# Patient Record
Sex: MALE | Race: WHITE | NOT HISPANIC OR LATINO | URBAN - METROPOLITAN AREA
[De-identification: names, ages, dates, MRNs, and addresses within clinical notes are randomized per-mention and may not be internally consistent; named-entity substitution may affect disease eponyms.]

---

## 2023-04-07 ENCOUNTER — APPOINTMENT (EMERGENCY)
Dept: CT IMAGING | Facility: HOSPITAL | Age: 43
End: 2023-04-07

## 2023-04-07 ENCOUNTER — HOSPITAL ENCOUNTER (EMERGENCY)
Facility: HOSPITAL | Age: 43
Discharge: HOME | End: 2023-04-08
Attending: EMERGENCY MEDICINE

## 2023-04-07 VITALS
HEART RATE: 81 BPM | BODY MASS INDEX: 45.1 KG/M2 | RESPIRATION RATE: 18 BRPM | DIASTOLIC BLOOD PRESSURE: 72 MMHG | SYSTOLIC BLOOD PRESSURE: 119 MMHG | TEMPERATURE: 97.5 F | OXYGEN SATURATION: 97 % | HEIGHT: 70 IN | WEIGHT: 315 LBS

## 2023-04-07 DIAGNOSIS — J44.1 ASTHMA WITH COPD WITH EXACERBATION (HCC): ICD-10-CM

## 2023-04-07 DIAGNOSIS — R07.9 CHEST PAIN WITH LOW RISK OF ACUTE CORONARY SYNDROME: ICD-10-CM

## 2023-04-07 DIAGNOSIS — K76.0 FATTY LIVER: Primary | ICD-10-CM

## 2023-04-07 DIAGNOSIS — M79.89 SOFT TISSUE MASS: ICD-10-CM

## 2023-04-07 DIAGNOSIS — J45.901 ASTHMA WITH COPD WITH EXACERBATION (HCC): ICD-10-CM

## 2023-04-07 DIAGNOSIS — N20.0 NEPHROLITHIASIS: ICD-10-CM

## 2023-04-07 LAB
ALBUMIN SERPL BCP-MCNC: 4.1 G/DL (ref 3.5–5)
ALP SERPL-CCNC: 127 U/L (ref 34–104)
ALT SERPL W P-5'-P-CCNC: 46 U/L (ref 7–52)
ANION GAP SERPL CALCULATED.3IONS-SCNC: 8 MMOL/L (ref 4–13)
AST SERPL W P-5'-P-CCNC: 24 U/L (ref 13–39)
ATRIAL RATE: 90 BPM
BASOPHILS # BLD AUTO: 0.07 THOUSANDS/ÂΜL (ref 0–0.1)
BASOPHILS NFR BLD AUTO: 1 % (ref 0–1)
BILIRUB SERPL-MCNC: 0.26 MG/DL (ref 0.2–1)
BUN SERPL-MCNC: 12 MG/DL (ref 5–25)
CALCIUM SERPL-MCNC: 9.5 MG/DL (ref 8.4–10.2)
CARDIAC TROPONIN I PNL SERPL HS: 4 NG/L
CHLORIDE SERPL-SCNC: 105 MMOL/L (ref 96–108)
CO2 SERPL-SCNC: 27 MMOL/L (ref 21–32)
CREAT SERPL-MCNC: 0.86 MG/DL (ref 0.6–1.3)
D DIMER PPP FEU-MCNC: 0.62 UG/ML FEU
EOSINOPHIL # BLD AUTO: 0.37 THOUSAND/ÂΜL (ref 0–0.61)
EOSINOPHIL NFR BLD AUTO: 3 % (ref 0–6)
ERYTHROCYTE [DISTWIDTH] IN BLOOD BY AUTOMATED COUNT: 13.1 % (ref 11.6–15.1)
GFR SERPL CREATININE-BSD FRML MDRD: 107 ML/MIN/1.73SQ M
GLUCOSE SERPL-MCNC: 97 MG/DL (ref 65–140)
HCT VFR BLD AUTO: 42 % (ref 36.5–49.3)
HGB BLD-MCNC: 14 G/DL (ref 12–17)
IMM GRANULOCYTES # BLD AUTO: 0.09 THOUSAND/UL (ref 0–0.2)
IMM GRANULOCYTES NFR BLD AUTO: 1 % (ref 0–2)
LITHIUM SERPL-SCNC: 0.3 MMOL/L (ref 0.6–1.2)
LYMPHOCYTES # BLD AUTO: 2.75 THOUSANDS/ÂΜL (ref 0.6–4.47)
LYMPHOCYTES NFR BLD AUTO: 20 % (ref 14–44)
MCH RBC QN AUTO: 29.6 PG (ref 26.8–34.3)
MCHC RBC AUTO-ENTMCNC: 33.3 G/DL (ref 31.4–37.4)
MCV RBC AUTO: 89 FL (ref 82–98)
MONOCYTES # BLD AUTO: 0.77 THOUSAND/ÂΜL (ref 0.17–1.22)
MONOCYTES NFR BLD AUTO: 6 % (ref 4–12)
NEUTROPHILS # BLD AUTO: 9.97 THOUSANDS/ÂΜL (ref 1.85–7.62)
NEUTS SEG NFR BLD AUTO: 69 % (ref 43–75)
NRBC BLD AUTO-RTO: 0 /100 WBCS
P AXIS: 42 DEGREES
PLATELET # BLD AUTO: 408 THOUSANDS/UL (ref 149–390)
PMV BLD AUTO: 9.7 FL (ref 8.9–12.7)
POTASSIUM SERPL-SCNC: 4.1 MMOL/L (ref 3.5–5.3)
PR INTERVAL: 164 MS
PROT SERPL-MCNC: 7.6 G/DL (ref 6.4–8.4)
QRS AXIS: 71 DEGREES
QRSD INTERVAL: 94 MS
QT INTERVAL: 384 MS
QTC INTERVAL: 469 MS
RBC # BLD AUTO: 4.73 MILLION/UL (ref 3.88–5.62)
SODIUM SERPL-SCNC: 140 MMOL/L (ref 135–147)
T WAVE AXIS: 39 DEGREES
VALPROATE SERPL-MCNC: 17 UG/ML (ref 50–100)
VENTRICULAR RATE: 90 BPM
WBC # BLD AUTO: 14.02 THOUSAND/UL (ref 4.31–10.16)

## 2023-04-07 RX ORDER — IPRATROPIUM BROMIDE AND ALBUTEROL SULFATE 2.5; .5 MG/3ML; MG/3ML
3 SOLUTION RESPIRATORY (INHALATION) 4 TIMES DAILY PRN
Qty: 30 ML | Refills: 0 | Status: SHIPPED | OUTPATIENT
Start: 2023-04-07 | End: 2023-05-07

## 2023-04-07 RX ORDER — IPRATROPIUM BROMIDE AND ALBUTEROL SULFATE 2.5; .5 MG/3ML; MG/3ML
3 SOLUTION RESPIRATORY (INHALATION) ONCE
Status: COMPLETED | OUTPATIENT
Start: 2023-04-07 | End: 2023-04-07

## 2023-04-07 RX ORDER — IPRATROPIUM BROMIDE AND ALBUTEROL SULFATE 2.5; .5 MG/3ML; MG/3ML
3 SOLUTION RESPIRATORY (INHALATION) 4 TIMES DAILY PRN
Qty: 30 ML | Refills: 0 | Status: SHIPPED | OUTPATIENT
Start: 2023-04-07 | End: 2023-04-07 | Stop reason: SDUPTHER

## 2023-04-07 RX ORDER — METHYLPREDNISOLONE 4 MG/1
TABLET ORAL
Qty: 21 TABLET | Refills: 0 | Status: SHIPPED | OUTPATIENT
Start: 2023-04-07

## 2023-04-07 RX ADMIN — SODIUM CHLORIDE 1000 ML: 0.9 INJECTION, SOLUTION INTRAVENOUS at 22:50

## 2023-04-07 RX ADMIN — IPRATROPIUM BROMIDE AND ALBUTEROL SULFATE 3 ML: 2.5; .5 SOLUTION RESPIRATORY (INHALATION) at 23:30

## 2023-04-08 NOTE — ED CARE HANDOFF
Emergency Department Sign Out Note        Sign out and transfer of care from Dr Jermaine Jackson  See Separate Emergency Department note  The patient, Gissell Kasper, was evaluated by the previous provider for cp  Workup Completed:  CTA ED chest PE study   Final Result      2 8 x 1 0 cm soft tissue and calcific density lesion is seen in the right costophrenic angle laterally; recommend pulmonary consultation consideration for PET/CT for further evaluation  If PET/CT is not pursued, a three-month follow-up CT is recommended    for confirmation of benignity  Lungs are otherwise clear  Nondiagnostic opacification of the pulmonary arteries  Pulmonary was obtained up excluded          No infiltrate, effusion or pneumothorax        I personally discussed this study with Dr Batool Kamara on 4/7/2023 at 11:32 PM                Workstation performed: [id number]            Labs Reviewed   CBC AND DIFFERENTIAL - Abnormal       Result Value Ref Range Status    WBC 14 02 (*) 4 31 - 10 16 Thousand/uL Final    RBC 4 73  3 88 - 5 62 Million/uL Final    Hemoglobin 14 0  12 0 - 17 0 g/dL Final    Hematocrit 42 0  36 5 - 49 3 % Final    MCV 89  82 - 98 fL Final    MCH 29 6  26 8 - 34 3 pg Final    MCHC 33 3  31 4 - 37 4 g/dL Final    RDW 13 1  11 6 - 15 1 % Final    MPV 9 7  8 9 - 12 7 fL Final    Platelets 653 (*) 644 - 390 Thousands/uL Final    nRBC 0  /100 WBCs Final    Neutrophils Relative 69  43 - 75 % Final    Immat GRANS % 1  0 - 2 % Final    Lymphocytes Relative 20  14 - 44 % Final    Monocytes Relative 6  4 - 12 % Final    Eosinophils Relative 3  0 - 6 % Final    Basophils Relative 1  0 - 1 % Final    Neutrophils Absolute 9 97 (*) 1 85 - 7 62 Thousands/µL Final    Immature Grans Absolute 0 09  0 00 - 0 20 Thousand/uL Final    Lymphocytes Absolute 2 75  0 60 - 4 47 Thousands/µL Final    Monocytes Absolute 0 77  0 17 - 1 22 Thousand/µL Final    Eosinophils Absolute 0 37  0 00 - 0 61 Thousand/µL Final    Basophils Absolute 0  07  0 00 - 0 10 Thousands/µL Final   COMPREHENSIVE METABOLIC PANEL - Abnormal    Sodium 140  135 - 147 mmol/L Final    Potassium 4 1  3 5 - 5 3 mmol/L Final    Chloride 105  96 - 108 mmol/L Final    CO2 27  21 - 32 mmol/L Final    ANION GAP 8  4 - 13 mmol/L Final    BUN 12  5 - 25 mg/dL Final    Creatinine 0 86  0 60 - 1 30 mg/dL Final    Comment: Standardized to IDMS reference method    Glucose 97  65 - 140 mg/dL Final    Comment: If the patient is fasting, the ADA then defines impaired fasting glucose as > 100 mg/dL and diabetes as > or equal to 123 mg/dL  Calcium 9 5  8 4 - 10 2 mg/dL Final    AST 24  13 - 39 U/L Final    ALT 46  7 - 52 U/L Final    Comment: Specimen collection should occur prior to Sulfasalazine administration due to the potential for falsely depressed results  Alkaline Phosphatase 127 (*) 34 - 104 U/L Final    Total Protein 7 6  6 4 - 8 4 g/dL Final    Albumin 4 1  3 5 - 5 0 g/dL Final    Total Bilirubin 0 26  0 20 - 1 00 mg/dL Final    Comment: Use of this assay is not recommended for patients undergoing treatment with eltrombopag due to the potential for falsely elevated results  N-acetyl-p-benzoquinone imine (metabolite of Acetaminophen) will generate erroneously low results in samples for patients that have taken an overdose of Acetaminophen      eGFR 107  ml/min/1 73sq m Final    Narrative:     [place] guidelines for Chronic Kidney Disease (CKD):   •  Stage 1 with normal or high GFR (GFR > 90 mL/min/1 73 square meters)  •  Stage 2 Mild CKD (GFR = 60-89 mL/min/1 73 square meters)  •  Stage 3A Moderate CKD (GFR = 45-59 mL/min/1 73 square meters)  •  Stage 3B Moderate CKD (GFR = 30-44 mL/min/1 73 square meters)  •  Stage 4 Severe CKD (GFR = 15-29 mL/min/1 73 square meters)  •  Stage 5 End Stage CKD (GFR <15 mL/min/1 73 square meters)  Note: GFR calculation is accurate only with a steady state creatinine   D-DIMER, QUANTITATIVE - Abnormal    D-Dimer, Gwenevere Sequin "0 62 (*) <0 50 ug/ml FEU Final    Comment: Reference and upper limits to exclude DVT and PE are the same  Do not use to exclude if clinical symptoms are present  LITHIUM LEVEL - Abnormal    Lithium Lvl 0 3 (*) 0 6 - 1 2 mmol/L Final   VALPROIC ACID LEVEL, TOTAL - Abnormal    Valproic Acid, Total 17 (*) 50 - 100 ug/mL Final   HS TROPONIN I 0HR - Normal    hs TnI 0hr 4  \"Refer to ACS Flowchart\"- see link ng/L Final    Comment:                                              Initial (time 0) result  If >=50 ng/L, Myocardial injury suggested ;  Type of myocardial injury and treatment strategy  to be determined  If 5-49 ng/L, a delta result at 2 hours and or 4 hours will be needed to further evaluate  If <4 ng/L, and chest pain has been >3 hours since onset, patient may qualify for discharge based on the HEART score in the ED  If <5 ng/L and <3hours since onset of chest pain, a delta result at 2 hours will be needed to further evaluate  HS Troponin 99th Percentile URL of a Health Population=12 ng/L with a 95% Confidence Interval of 8-18 ng/L  Second Troponin (time 2 hours)  If calculated delta >= 20 ng/L,  Myocardial injury suggested ; Type of myocardial injury and treatment strategy to be determined  If 5-49 ng/L and the calculated delta is 5-19 ng/L, consult medical service for evaluation  Continue evaluation for ischemia on ecg and other possible etiology and repeat hs troponin at 4 hours  If delta is <5 ng/L at 2 hours, consider discharge based on risk stratification via the HEART score (if in ED), or DYLAN risk score in IP/Observation  HS Troponin 99th Percentile URL of a Health Population=12 ng/L with a 95% Confidence Interval of 8-18 ng/L  ED Course / Workup Pending (followup):         HEART Risk Score    Flowsheet Row Most Recent Value   Heart Score Risk Calculator    History 0 Filed at: 04/07/2023 2041   ECG 0 Filed at: 04/07/2023 2041   Age 0 Filed at: 04/07/2023 2041   Risk Factors 1 Filed " at: 04/07/2023 2041   Troponin 0 Filed at: 04/07/2023 2041   HEART Score 1 Filed at: 04/07/2023 2041                        Wells' Criteria for PE    Flowsheet Row Most Recent Value   Wells' Criteria for PE    Clinical signs and symptoms of DVT 0 Filed at: 04/07/2023 2259   PE is primary diagnosis or equally likely 0 Filed at: 04/07/2023 2259   HR >100 0 Filed at: 04/07/2023 2259   Immobilization at least 3 days or Surgery in the previous 4 weeks 0 Filed at: 04/07/2023 2259   Previous, objectively diagnosed PE or DVT 0 Filed at: 04/07/2023 2259   Hemoptysis 0 Filed at: 04/07/2023 2259   Malignancy with treatment within 6 months or palliative 0 Filed at: 04/07/2023 2259   Wells' Criteria Total 0 Filed at: 04/07/2023 2259                ED Course as of 04/07/23 2350   Fri Apr 07, 2023   2204 S/o from Dr Vidal Soto  Pe study pend  If neg, can d/c home  2243 Discussed w/ Dr Leola Gee - imaging was nondiagnostic due to dye load timing  Will discuss with patient  Dr Leola eGe ok with repeat CT scan with his GFR    2300 Pt did already get 2 dye loads the first time  Not advisable to do a 3rd immediately  Pt with low pretest probabilty of PE (Wells 0) and a dimer < 1000 ng/ml  Per recent literature, PE can be ruled out  Will discuss with pt     2317 Updated pt and wife  Agreeable to hold of on CT after discussion  He does note chest tightness, intermittent  Has pmh of asthma/copd, smokes tobacco  His trigger for exacerbations is stress/anger/anxiety  He admits to those feelings now  He is slightly wheezy on exam, though sats are normal and talking in full sentences  Will give duoneb and reassess  If improved, d/c home, recommend pulm eval, f/u with PCP and strict RTED precautions  Pt and wife agree with plan  2349 Pt feeling improved  Discussed incidental findings, advised f/u with pulm and PCP  Pt and wife agree with plan        Procedures  Medical Decision Making  Asthma with COPD with exacerbation [facility]): acute illness or injury  Fatty liver: acute illness or injury  Nephrolithiasis: acute illness or injury  Soft tissue mass: acute illness or injury  Amount and/or Complexity of Data Reviewed  Independent Historian: miguel angel  External Data Reviewed: notes  Labs: ordered  Radiology: ordered  Decision-making details documented in ED Course  Risk  Prescription drug management  Disposition  Final diagnoses:   Fatty liver   Nephrolithiasis   Soft tissue mass   Asthma with COPD with exacerbation (HCC)   Chest pain with low risk of acute coronary syndrome     Time reflects when diagnosis was documented in both MDM as applicable and the Disposition within this note     Time User Action Codes Description Comment    4/7/2023 11:46 PM Deatrice Denver S Add [K76 0] Fatty liver     4/7/2023 11:46 PM Deatrice Denver S Add [N20 0] Nephrolithiasis     4/7/2023 11:46 PM Ash Aroraon Add [M79 89] Soft tissue mass     4/7/2023 11:46 PM Ash Aroraon Add [J44 1,  I25 416] Asthma with COPD with exacerbation ([facility] [address] )     4/7/2023 11:47 PM Ash Aroraon Add [R07 9] Chest pain with low risk of acute coronary syndrome       ED Disposition     ED Disposition   Discharge    Condition   Stable    Date/Time   Fri Apr 7, 2023 11:45 PM    3 Evan Rivera discharge to home/self care                 Follow-up Information     Follow up With Specialties Details Why Contact Info Additional Information     [address] Emergency Department Emergency Medicine  If symptoms worsen [address] [place] [address]  [address] [facility] Emergency Department, [address] [place], [place] [address]    [facility] Pulmonology Schedule an appointment as soon as possible for a visit  You need repeat CT scan/PET scan in 3 months to reevaluate soft tissue mass as discussed [address] [phone]  [facility] [address], [address], [place], [place], [address] [place] [address]    Follow up with your family doctor for reevaluation             Patient's Medications   Discharge Prescriptions    METHYLPREDNISOLONE 4 MG TABLET THERAPY PACK    Use as directed on package       Start Date: 4/7/2023  End Date: --       Order Dose: --       Quantity: 21 tablet    Refills: 0            ED Provider  Electronically Signed by     Yeni Mitchell MD  04/07/23 1230

## 2023-04-08 NOTE — ED PROVIDER NOTES
"History  Chief Complaint   Patient presents with   • Dizziness     Pt had dizziness and chest pain  Pt said he was worked up a [facility] but walked out  Pt has chronic back pain  States \"he doesn't feel right\"   • Chest Pain     Left sided chest tightness  States started 3 hr ago  Non-radiating  40year old male presents for evaluation of lightheadedness and chest pain which began while sitting in the car around 5 pm today  Chest pain is described as a tightness which is intermittent since onset with associated intermittent shortness of breath  No exacerbating or alleviating factors  Lightheadedness is associated with a foggy sensation in his head and the sensation he may pass out  No LOC  Patient initially went to [facility] where a chest xray was performed and found to be unremarkable  He left prior to being seen by a provider  Patient reports recent cramping in his legs which began yesterday  No recent travel, but had been admitted to [facility] 2 months ago  Patient reports recent medication changes with tapering down on his depakote and starting lithium  He reports increased stress and anxiety recently  None       No past medical history on file  No past surgical history on file  No family history on file  I have reviewed and agree with the history as documented  No existing history information found  No existing history information found  Review of Systems    Physical Exam  Physical Exam  Vitals and nursing note reviewed  HENT:      Mouth/Throat:      Mouth: Mucous membranes are moist    Eyes:      Conjunctiva/sclera: Conjunctivae normal    Cardiovascular:      Rate and Rhythm: Normal rate and regular rhythm  Pulses: Normal pulses  Heart sounds: Normal heart sounds  Pulmonary:      Effort: Pulmonary effort is normal  No respiratory distress  Breath sounds: Normal breath sounds  Musculoskeletal:      Right lower leg: Edema (trace) present        Left lower leg: Edema " (trace) present  Skin:     General: Skin is warm and dry           Vital Signs  ED Triage Vitals   Temperature Pulse Respirations Blood Pressure SpO2   04/07/23 2125 04/07/23 2006 04/07/23 2006 04/07/23 2006 04/07/23 2006   97 5 °F (36 4 °C) 94 20 164/97 97 %      Temp Source Heart Rate Source Patient Position - Orthostatic VS BP Location FiO2 (%)   04/07/23 2125 -- 04/07/23 2019 04/07/23 2019 --   Oral  Lying Left arm       Pain Score       --                  Vitals:    04/07/23 2030 04/07/23 2100 04/07/23 2200 04/07/23 2300   BP: 132/81 142/83 140/76 119/72   Pulse: 86 84 80 81   Patient Position - Orthostatic VS:             Visual Acuity  Visual Acuity    Flowsheet Row Most Recent Value   L Pupil Size (mm) 3   R Pupil Size (mm) 3          ED Medications  Medications   iohexol (OMNIPAQUE) 350 MG/ML injection (SINGLE-DOSE) 100 mL (170 mL Intravenous Given 4/7/23 2205)   sodium chloride 0 9 % bolus 1,000 mL (0 mL Intravenous Stopped 4/7/23 2351)   ipratropium-albuterol (DUO-NEB) 0 5-2 5 mg/3 mL inhalation solution 3 mL (3 mL Nebulization Given 4/7/23 2330)       Diagnostic Studies  Results Reviewed     Procedure Component Value Units Date/Time    HS Troponin 0hr (reflex protocol) [[id number]]  (Normal) Collected: 04/07/23 2029    Lab Status: Final result Specimen: Blood from Line, Venous Updated: 04/07/23 2115     hs TnI 0hr 4 ng/L     D-Dimer [[id number]]  (Abnormal) Collected: 04/07/23 2029    Lab Status: Final result Specimen: Blood from Arm, Right Updated: 04/07/23 2113     D-Dimer, Quant 0 62 ug/ml FEU     Lithium level [[id number]]  (Abnormal) Collected: 04/07/23 2029    Lab Status: Final result Specimen: Blood from Arm, Right Updated: 04/07/23 2112     Lithium Lvl 0 3 mmol/L     Comprehensive metabolic panel [[id number]]  (Abnormal) Collected: 04/07/23 2029    Lab Status: Final result Specimen: Blood from Line, Venous Updated: 04/07/23 2111     Sodium 140 mmol/L      Potassium 4 1 mmol/L      Chloride 105 mmol/L      CO2 27 mmol/L      ANION GAP 8 mmol/L      BUN 12 mg/dL      Creatinine 0 86 mg/dL      Glucose 97 mg/dL      Calcium 9 5 mg/dL      AST 24 U/L      ALT 46 U/L      Alkaline Phosphatase 127 U/L      Total Protein 7 6 g/dL      Albumin 4 1 g/dL      Total Bilirubin 0 26 mg/dL      eGFR 107 ml/min/1 73sq m     Narrative:      National Kidney Disease Foundation guidelines for Chronic Kidney Disease (CKD):   •  Stage 1 with normal or high GFR (GFR > 90 mL/min/1 73 square meters)  •  Stage 2 Mild CKD (GFR = 60-89 mL/min/1 73 square meters)  •  Stage 3A Moderate CKD (GFR = 45-59 mL/min/1 73 square meters)  •  Stage 3B Moderate CKD (GFR = 30-44 mL/min/1 73 square meters)  •  Stage 4 Severe CKD (GFR = 15-29 mL/min/1 73 square meters)  •  Stage 5 End Stage CKD (GFR <15 mL/min/1 73 square meters)  Note: GFR calculation is accurate only with a steady state creatinine    Valproic acid level, total [[id number]]  (Abnormal) Collected: 04/07/23 2029    Lab Status: Final result Specimen: Blood from Arm, Right Updated: 04/07/23 2110     Valproic Acid, Total 17 ug/mL     CBC and differential [[id number]]  (Abnormal) Collected: 04/07/23 2029    Lab Status: Final result Specimen: Blood from Line, Venous Updated: 04/07/23 2053     WBC 14 02 Thousand/uL      RBC 4 73 Million/uL      Hemoglobin 14 0 g/dL      Hematocrit 42 0 %      MCV 89 fL      MCH 29 6 pg      MCHC 33 3 g/dL      RDW 13 1 %      MPV 9 7 fL      Platelets 856 Thousands/uL      nRBC 0 /100 WBCs      Neutrophils Relative 69 %      Immat GRANS % 1 %      Lymphocytes Relative 20 %      Monocytes Relative 6 %      Eosinophils Relative 3 %      Basophils Relative 1 %      Neutrophils Absolute 9 97 Thousands/µL      Immature Grans Absolute 0 09 Thousand/uL      Lymphocytes Absolute 2 75 Thousands/µL      Monocytes Absolute 0 77 Thousand/µL      Eosinophils Absolute 0 37 Thousand/µL      Basophils Absolute 0 07 Thousands/µL                  CTA ED chest PE study Final Result by Naz Andrews MD (04/07 9678)      2 8 x 1 0 cm soft tissue and calcific density lesion is seen in the right costophrenic angle laterally; recommend pulmonary consultation consideration for PET/CT for further evaluation  If PET/CT is not pursued, a three-month follow-up CT is recommended    for confirmation of benignity  Lungs are otherwise clear  Nondiagnostic opacification of the pulmonary arteries  Pulmonary was obtained up excluded          No infiltrate, effusion or pneumothorax        I personally discussed this study with Dr Batool Kamara on 4/7/2023 at 11:32 PM                Workstation performed: [id number]                    Procedures  ECG 12 Lead Documentation Only    Date/Time: 4/7/2023 8:25 PM  Performed by: Ami Russo MD  Authorized by: Ami Russo MD     Indications / Diagnosis:  Chest pain  ECG reviewed by me, the ED Provider: yes    Patient location:  ED  Previous ECG:     Previous ECG:  Unavailable  Interpretation:     Interpretation: normal    Rate:     ECG rate:  90    ECG rate assessment: normal    Rhythm:     Rhythm: sinus rhythm    Ectopy:     Ectopy: none    QRS:     QRS axis:  Normal    QRS intervals:  Normal  Conduction:     Conduction: normal    ST segments:     ST segments:  Normal  T waves:     T waves: normal               ED Course  ED Course as of 04/08/23 1344   Fri Apr 07, 2023 2125 D-Dimer, Quant(!): 0 62  CT PE study ordered             HEART Risk Score    Flowsheet Row Most Recent Value   Heart Score Risk Calculator    History 0 Filed at: 04/07/2023 2041   ECG 0 Filed at: 04/07/2023 2041   Age 0 Filed at: 04/07/2023 2041   Risk Factors 1 Filed at: 04/07/2023 2041   Troponin 0 Filed at: 04/07/2023 2041   HEART Score 1 Filed at: 04/07/2023 2041                        SBIRT 22yo+    Flowsheet Row Most Recent Value   SBIRT (23 yo +)    In order to provide better care to our patients, we are screening all of our patients for alcohol and drug use  Would it be okay to ask you these screening questions? Yes Filed at: 04/07/2023 2016   Initial Alcohol Screen: US AUDIT-C     1  How often do you have a drink containing alcohol? 0 Filed at: 04/07/2023 2016   2  How many drinks containing alcohol do you have on a typical day you are drinking? 0 Filed at: 04/07/2023 2016   3a  Male UNDER 65: How often do you have five or more drinks on one occasion? 0 Filed at: 04/07/2023 2016   Audit-C Score 0 Filed at: 04/07/2023 2016   JULIAN: How many times in the past year have you    Used an illegal drug or used a prescription medication for non-medical reasons? Never Filed at: 04/07/2023 2016          [place]aya Retana Criteria for PE    Flowsheet Row Most Recent Value   Rudy' Criteria for PE    Clinical signs and symptoms of DVT 0 Filed at: 04/07/2023 2259   PE is primary diagnosis or equally likely 0 Filed at: 04/07/2023 2259   HR >100 0 Filed at: 04/07/2023 2259   Immobilization at least 3 days or Surgery in the previous 4 weeks 0 Filed at: 04/07/2023 2259   Previous, objectively diagnosed PE or DVT 0 Filed at: 04/07/2023 2259   Hemoptysis 0 Filed at: 04/07/2023 2259   Malignancy with treatment within 6 months or palliative 0 Filed at: 04/07/2023 2259   Rudy' Criteria Total 0 Filed at: 04/07/2023 2259                Medical Decision Making  40year old male presents for evaluation of intermittent chest pain and shortness of breath for the past 3 hours  HEART score 1  Low risk Well's score for PE with positive d-dimer  CT PE study pending  Patient signed out to Dr Brooklyn Tristan  Asthma with COPD with exacerbation [facility]): acute illness or injury  Chest pain with low risk of acute coronary syndrome: acute illness or injury  Fatty liver: self-limited or minor problem  Nephrolithiasis: self-limited or minor problem  Soft tissue mass: self-limited or minor problem  Amount and/or Complexity of Data Reviewed  Labs: ordered   Decision-making details documented in ED Course  Radiology: ordered  Risk  Prescription drug management  Disposition  Final diagnoses:   Fatty liver   Nephrolithiasis   Soft tissue mass   Asthma with COPD with exacerbation (HCC)   Chest pain with low risk of acute coronary syndrome     Time reflects when diagnosis was documented in both MDM as applicable and the Disposition within this note     Time User Action Codes Description Comment    4/7/2023 11:46 PM Anamaria Fry S Add [K76 0] Fatty liver     4/7/2023 11:46 PM Anamaria Fry S Add [N20 0] Nephrolithiasis     4/7/2023 11:46 PM Tara Kick Add [M79 89] Soft tissue mass     4/7/2023 11:46 PM Tara Kick Add [J44 1,  I14 658] Asthma with COPD with exacerbation ([address] )     4/7/2023 11:47 PM Tara Kick Add [R07 9] Chest pain with low risk of acute coronary syndrome       ED Disposition     ED Disposition   Discharge    Condition   Stable    Date/Time   Fri Apr 7, 2023 11:45 PM    [address] discharge to home/self care                 Follow-up Information     Follow up With Specialties Details Why Contact Info Additional Information     [address] Emergency Department Emergency Medicine  If symptoms worsen [address] [place],[address] [address]  [address] [facility] Emergency Department, [address] [facility] [address]    [facility] Pulmonology Schedule an appointment as soon as possible for a visit  You need repeat CT scan/PET scan in 3 months to reevaluate soft tissue mass as discussed [address] [phone] [facility] [address], [address], [address] [phone]    Follow up with your family doctor for reevaluation               Discharge Medication List as of 4/7/2023 11:49 PM      START taking these medications Details   methylPREDNISolone 4 MG tablet therapy pack Use as directed on package, Normal                 PDMP Review     None          ED Provider  Electronically Signed by           Fernanda Campos MD  04/08/23 4569

## 2023-09-06 ENCOUNTER — TELEPHONE (OUTPATIENT)
Dept: PSYCHIATRY | Facility: CLINIC | Age: 43
End: 2023-09-06

## 2023-09-06 NOTE — TELEPHONE ENCOUNTER
Patient has been added to the Medication Management wait list without a referral.    Insurance: [address] Type:    Commercial []   Medicaid [x]   [place] (if applicable)   Medicare []  Location Preference: [email or web address]  Provider Preference: pref fem prov  Virtual: Yes [] No [x]

## 2024-03-05 ENCOUNTER — HOSPITAL ENCOUNTER (EMERGENCY)
Facility: HOSPITAL | Age: 44
Discharge: HOME | End: 2024-03-05
Attending: EMERGENCY MEDICINE
Payer: COMMERCIAL

## 2024-03-05 VITALS
HEART RATE: 115 BPM | OXYGEN SATURATION: 98 % | SYSTOLIC BLOOD PRESSURE: 168 MMHG | DIASTOLIC BLOOD PRESSURE: 100 MMHG | RESPIRATION RATE: 22 BRPM | TEMPERATURE: 98.2 F

## 2024-03-05 DIAGNOSIS — R45.1 AGITATION: Primary | ICD-10-CM

## 2024-03-05 PROCEDURE — 99284 EMERGENCY DEPT VISIT MOD MDM: CPT

## 2024-03-05 PROCEDURE — 99284 EMERGENCY DEPT VISIT MOD MDM: CPT | Performed by: EMERGENCY MEDICINE

## 2024-03-05 NOTE — ED NOTES
Pt presented to the ED due to allegedly swinging a hammer at [facility] police.  Pt is a [facility] resident.      Pt presented with no suicidal or homicidal ideation.  Pt reported no delusions or psychosis symptoms.  Pt reported moving to [place] with treatment providers setup already.  Pt is future focus with secured housing options.  Pt denies access to weapons.  Pt reported GERD as medical diagnosis.      Pt to be discharged.

## 2024-03-05 NOTE — ED PROVIDER NOTES
History  Chief Complaint   Patient presents with    Aggressive Behavior     Pt charged police with a hammer. Denies SI/HI     43-year-old male presents via EMS after an altercation with police where the patient allegedly came to the doorway of residence holding a hammer. Per EMS, Police were at the scene and the patient was reportedly aggressive at that time.  EMS states that the patient willingly came into the ambulance and the patient was calm and cooperative during transport. The patient admits to not wanting the police on the property where he was as he is just trying to pack his things up so he can moved to [place].  The patient denies any auditory or visual hallucinations.  The patient denies any suicidal homicidal ideation.  Patient states that he has an appointment with his treating therapist at [facility] in [place] tomorrow to get refills on his medications prior to moving to the [facility] where he is already established with a provider down there.  Patient admits that he was angry at his landlord and the police but at no time wanted to inflict any physical harm on anybody.  Police were not present at the hospital to provide any information.          Prior to Admission Medications   Prescriptions Last Dose Informant Patient Reported? Taking?   methylPREDNISolone 4 MG tablet therapy pack   No No   Sig: Use as directed on package      Facility-Administered Medications: None       Past Medical History:   Diagnosis Date    Asthma     GERD (gastroesophageal reflux disease)     Psychiatric disorder        History reviewed. No pertinent surgical history.    History reviewed. No pertinent family history.  I have reviewed and agree with the history as documented.    E-Cigarette/Vaping    E-Cigarette Use Current Every Day User      E-Cigarette/Vaping Substances    Nicotine Yes     CBD Yes      Social History     Tobacco Use    Smoking status: Never    Smokeless tobacco: Never   Vaping Use    Vaping status: Every Day     Substances: Nicotine, CBD   Substance Use Topics    Alcohol use: Never    Drug use: Yes     Types: Marijuana       Review of Systems    Physical Exam  Physical Exam  Vitals and nursing note reviewed.   Constitutional:       General: He is not in acute distress.     Appearance: He is well-developed.   HENT:      Head: Normocephalic and atraumatic.      Right Ear: External ear normal.      Left Ear: External ear normal.   Eyes:      General: No scleral icterus.  Pulmonary:      Effort: Pulmonary effort is normal. No respiratory distress.   Abdominal:      General: There is no distension.   Musculoskeletal:         General: Normal range of motion.      Cervical back: Normal range of motion.   Skin:     Findings: No rash.   Neurological:      Mental Status: He is alert. Mental status is at baseline.   Psychiatric:         Attention and Perception: He does not perceive auditory or visual hallucinations.         Mood and Affect: Mood normal. Affect is not angry.         Behavior: Behavior is not agitated or aggressive.         Thought Content: Thought content is not paranoid. Thought content does not include homicidal or suicidal ideation.         Cognition and Memory: Cognition normal.         Judgment: Judgment is not impulsive.      Comments: No tangential thought or appearance of responding to internal stimuli    Diego from crisis was present during the entire history and physical exam.         Vital Signs  ED Triage Vitals [03/05/24 1400]   Temperature Pulse Respirations Blood Pressure SpO2   98.2 °F (36.8 °C) (!) 115 22 168/100 98 %      Temp Source Heart Rate Source Patient Position - Orthostatic VS BP Location FiO2 (%)   Temporal Monitor Sitting Right arm --      Pain Score       No Pain           Vitals:    03/05/24 1400   BP: 168/100   Pulse: (!) 115   Patient Position - Orthostatic VS: Sitting         Visual Acuity      ED Medications  Medications - No data to display    Diagnostic Studies  Results Reviewed        None                   No orders to display              Procedures  Procedures         ED Course  ED Course as of 03/05/24 1543   Tue Mar 05, 2024   1415 Discussed with Diego from crisis, agrees that there is no grounds for involuntary committment                               SBIRT 20yo+      Flowsheet Row Most Recent Value   Initial Alcohol Screen: US AUDIT-C     1. How often do you have a drink containing alcohol? 0 Filed at: 03/05/2024 1410   2. How many drinks containing alcohol do you have on a typical day you are drinking?  0 Filed at: 03/05/2024 1410   3a. Male UNDER 65: How often do you have five or more drinks on one occasion? 0 Filed at: 03/05/2024 1410   3b. FEMALE Any Age, or MALE 65+: How often do you have 4 or more drinks on one occassion? 0 Filed at: 03/05/2024 1410   Audit-C Score 0 Filed at: 03/05/2024 1410   JULIAN: How many times in the past year have you...    Used an illegal drug or used a prescription medication for non-medical reasons? Never Filed at: 03/05/2024 1410                      Medical Decision Making  Patient with without any suicidal or homicidal ideation.  EMS denied any suicidal or homicidal ideation.  Patient denies any auditory or visual hallucinations.  Patient denies any paranoia.  Patient has clear mental health treatment and plan for transition with his upcoming move.  Patient has follow-up appointment to get his medications refilled tomorrow with his treatment team at [facility] in [place].  Patient already has a therapy team lined up in [place].    Patient's girlfriend present at time of discharge.  She denies witnessing any suicidal or homicidal ideation.  Plan for discharge with girlfriend so the patient continue packing and follow-up with his mental health resources as previously scheduled.    Amount and/or Complexity of Data Reviewed  External Data Reviewed: notes.     Details: ED notes from [facility] reviewed from August              Disposition  Final diagnoses:   Agitation     Time reflects when diagnosis was documented in both MDM as applicable and the Disposition within this note       Time User Action Codes Description Comment    3/5/2024  2:22 PM Emmanuel Tavares Add [R45.1] Agitation           ED Disposition       ED Disposition   Discharge    Condition   Stable    Date/Time   Tue Mar 5, 2024 1421    Comment   Anthony Wambhanane discharge to home/self care.                   Follow-up Information       Follow up With Specialties Details Why Contact [place] [email or web address]  [place] [address]  [phone]            Discharge Medication List as of 3/5/2024  2:22 PM        CONTINUE these medications which have NOT CHANGED    Details   methylPREDNISolone 4 MG tablet therapy pack Use as directed on package, Normal             No discharge procedures on file.    PDMP Review       None            ED Provider  Electronically Signed by             Emmanuel Tavares DO  03/05/24 3501

## 2024-09-23 ENCOUNTER — TELEPHONE (OUTPATIENT)
Age: 44
End: 2024-09-23

## 2025-04-08 ENCOUNTER — TELEPHONE (OUTPATIENT)
Dept: INTERNAL MEDICINE CLINIC | Facility: CLINIC | Age: 45
End: 2025-04-08

## 2025-04-08 NOTE — TELEPHONE ENCOUNTER
Received message that patient wanted to schedule an appt. Reached out to pt and call was not completed.